# Patient Record
Sex: FEMALE | Race: WHITE | URBAN - METROPOLITAN AREA
[De-identification: names, ages, dates, MRNs, and addresses within clinical notes are randomized per-mention and may not be internally consistent; named-entity substitution may affect disease eponyms.]

---

## 2019-07-18 ENCOUNTER — NEW PATIENT (OUTPATIENT)
Dept: URBAN - METROPOLITAN AREA CLINIC 24 | Facility: CLINIC | Age: 74
End: 2019-07-18
Payer: MEDICARE

## 2019-07-18 DIAGNOSIS — E11.9 TYPE 2 DIABETES MELLITUS WITHOUT COMPLICATIONS: Primary | ICD-10-CM

## 2019-07-18 DIAGNOSIS — Z79.84 LONG TERM (CURRENT) USE OF ORAL ANTIDIABETIC DRUGS: ICD-10-CM

## 2019-07-18 PROCEDURE — 92004 COMPRE OPH EXAM NEW PT 1/>: CPT | Performed by: OPTOMETRIST

## 2019-07-18 PROCEDURE — 92134 CPTRZ OPH DX IMG PST SGM RTA: CPT | Performed by: OPTOMETRIST

## 2019-07-18 ASSESSMENT — VISUAL ACUITY
OD: 20/50
OS: 20/20

## 2019-07-18 ASSESSMENT — INTRAOCULAR PRESSURE
OD: 12
OS: 14

## 2019-07-18 ASSESSMENT — KERATOMETRY: OD: 46.12

## 2019-08-13 ENCOUNTER — Encounter (OUTPATIENT)
Dept: URBAN - METROPOLITAN AREA CLINIC 24 | Facility: CLINIC | Age: 74
End: 2019-08-13
Payer: MEDICARE

## 2019-08-13 DIAGNOSIS — Z01.818 ENCOUNTER FOR OTHER PREPROCEDURAL EXAMINATION: Primary | ICD-10-CM

## 2019-08-13 DIAGNOSIS — H26.491 OTHER SECONDARY CATARACT, RIGHT EYE: ICD-10-CM

## 2019-08-13 PROCEDURE — 99213 OFFICE O/P EST LOW 20 MIN: CPT | Performed by: PHYSICIAN ASSISTANT

## 2019-08-19 ENCOUNTER — SURGERY (OUTPATIENT)
Dept: URBAN - METROPOLITAN AREA SURGERY 12 | Facility: SURGERY | Age: 74
End: 2019-08-19
Payer: MEDICARE

## 2019-08-19 PROCEDURE — 66821 AFTER CATARACT LASER SURGERY: CPT | Performed by: OPHTHALMOLOGY

## 2021-03-31 ENCOUNTER — OFFICE VISIT (OUTPATIENT)
Dept: URBAN - METROPOLITAN AREA CLINIC 27 | Facility: CLINIC | Age: 76
End: 2021-03-31
Payer: MEDICARE

## 2021-03-31 DIAGNOSIS — H35.61 RETINAL HEMORRHAGE, RIGHT EYE: ICD-10-CM

## 2021-03-31 DIAGNOSIS — H35.371 PUCKERING OF MACULA, RIGHT EYE: ICD-10-CM

## 2021-03-31 PROCEDURE — 99204 OFFICE O/P NEW MOD 45 MIN: CPT | Performed by: OPHTHALMOLOGY

## 2021-03-31 PROCEDURE — 92134 CPTRZ OPH DX IMG PST SGM RTA: CPT | Performed by: OPHTHALMOLOGY

## 2021-03-31 PROCEDURE — 92235 FLUORESCEIN ANGRPH MLTIFRAME: CPT | Performed by: OPHTHALMOLOGY

## 2021-03-31 ASSESSMENT — INTRAOCULAR PRESSURE
OS: 16
OD: 21

## 2021-03-31 NOTE — IMPRESSION/PLAN
Impression: DME, OD Plan: Exam and OCT reveal DME. An IVFA from 03/31/2021 demonstrated no NVE. Fundus Photos from 03/31/2021 demonstrated no NVD. Recommend Avastin series with focal. 
RBA discussed. The patient elects Avastin (bilateral)

## 2021-03-31 NOTE — IMPRESSION/PLAN
Impression: ERM, OS. Plan: Exam and OCT reveal an ERM (379 microns)  Exam and OCT today show an epiretinal membrane (ERM), but the patient is doing well with their current vision, and as such we will observe the ERM for now.

## 2021-03-31 NOTE — IMPRESSION/PLAN
Impression: ERM, OD. Plan: Exam and OCT reveal an ERM (379 microns)  Exam and OCT today show an epiretinal membrane (ERM), but the patient is doing well with their current vision, and as such we will observe the ERM for now.

## 2021-03-31 NOTE — IMPRESSION/PLAN
Impression: DME, OS. Plan: Exam and OCT reveal DME. An IVFA from 03/31/2021 demonstrated no NVE. Fundus Photos from 03/31/2021 demonstrated no NVD. Recommend Avastin series with focal. 
RBA discussed. The patient elects Avastin (bilateral). Will consider vitrectomy in each eye. Thanks, Erwin Isaac Return in 2 days for focal OS, then focal OD, and in 1 month for re-eval DME OU

## 2021-04-02 ENCOUNTER — PROCEDURE (OUTPATIENT)
Dept: URBAN - METROPOLITAN AREA CLINIC 27 | Facility: CLINIC | Age: 76
End: 2021-04-02
Payer: MEDICARE

## 2021-04-02 PROCEDURE — 67210 TREATMENT OF RETINAL LESION: CPT | Performed by: OPHTHALMOLOGY

## 2021-04-02 ASSESSMENT — INTRAOCULAR PRESSURE
OD: 20
OS: 16

## 2021-04-09 ENCOUNTER — PROCEDURE (OUTPATIENT)
Dept: URBAN - METROPOLITAN AREA CLINIC 27 | Facility: CLINIC | Age: 76
End: 2021-04-09
Payer: MEDICARE

## 2021-04-09 PROCEDURE — 67210 TREATMENT OF RETINAL LESION: CPT | Performed by: OPHTHALMOLOGY

## 2021-04-09 ASSESSMENT — INTRAOCULAR PRESSURE
OD: 18
OS: 14

## 2021-04-28 ENCOUNTER — OFFICE VISIT (OUTPATIENT)
Dept: URBAN - METROPOLITAN AREA CLINIC 27 | Facility: CLINIC | Age: 76
End: 2021-04-28
Payer: MEDICARE

## 2021-04-28 DIAGNOSIS — H43.813 VITREOUS DEGENERATION, BILATERAL: ICD-10-CM

## 2021-04-28 DIAGNOSIS — H35.373 PUCKERING OF MACULA, BILATERAL: ICD-10-CM

## 2021-04-28 DIAGNOSIS — H04.123 DRY EYE SYNDROME OF BILATERAL LACRIMAL GLANDS: ICD-10-CM

## 2021-04-28 PROCEDURE — 99024 POSTOP FOLLOW-UP VISIT: CPT | Performed by: OPHTHALMOLOGY

## 2021-04-28 PROCEDURE — 92134 CPTRZ OPH DX IMG PST SGM RTA: CPT | Performed by: OPHTHALMOLOGY

## 2021-04-28 ASSESSMENT — INTRAOCULAR PRESSURE
OS: 19
OD: 25

## 2021-04-28 NOTE — IMPRESSION/PLAN
Impression: ERM, OS. Plan: Exam and OCT reveal an ERM OS (345 microns, from 379 microns)  Observe for now.

## 2021-04-28 NOTE — IMPRESSION/PLAN
Impression: DME, OS. CSCR OS
s/p Focal 04/02/2021 
s/p Avastin x 1 last 03/31/2021 Plan: Exam and OCT reveal DME and SRF. An IVFA from 03/31/2021 demonstrated no NVE. Fundus Photos from 03/31/2021 demonstrated no NVD. Recommend Avastin  RBA discussed. The patient elects Avastin (bilateral). Will consider vitrectomy in each eye. Thanks, Leah Palomares Return in 1 month for re-eval DME OU

## 2021-04-28 NOTE — IMPRESSION/PLAN
Impression: ERM, OD. Plan: Exam and OCT reveal an ERM OD (283 microns, from 379 microns),  Observe for now.

## 2021-04-28 NOTE — IMPRESSION/PLAN
Impression: DME, OD
s/p Focal 04/09/2021 
s/p Avastin x 1 last 03/31/2021  Plan: Exam and OCT reveal DME. An IVFA from 03/31/2021 demonstrated no NVE. Fundus Photos from 03/31/2021 demonstrated no NVD. Recommend Avastin  RBA discussed.  The patient elects Avastin (bilateral)

## 2021-05-28 ENCOUNTER — OFFICE VISIT (OUTPATIENT)
Dept: URBAN - METROPOLITAN AREA CLINIC 27 | Facility: CLINIC | Age: 76
End: 2021-05-28
Payer: MEDICARE

## 2021-05-28 PROCEDURE — 99024 POSTOP FOLLOW-UP VISIT: CPT | Performed by: OPHTHALMOLOGY

## 2021-05-28 ASSESSMENT — INTRAOCULAR PRESSURE
OD: 12
OS: 18

## 2021-05-28 NOTE — IMPRESSION/PLAN
Impression: DME, OS. CSCR OS
s/p Focal 04/02/2021 
s/p Avastin x 1 last 03/31/2021 Plan: Exam and OCT reveal DME and SRF. An IVFA from 03/31/2021 demonstrated no NVE. Fundus Photos from 03/31/2021 demonstrated no NVD. Recommend Avastin  RBA discussed. The patient elects Avastin (bilateral). Will consider vitrectomy in each eye. Thanks, Sulma Vee Return in 1 month for re-eval DME OU

## 2021-05-28 NOTE — IMPRESSION/PLAN
Impression: ERM, OD. Plan: Exam and OCT reveal an ERM OD (389 microns, from 379 microns),  Observe for now.

## 2021-05-28 NOTE — IMPRESSION/PLAN
Impression: ERM, OS. Plan: Exam and OCT reveal an ERM OS (339 microns, from 379 microns)  Observe for now.

## 2021-07-09 ENCOUNTER — OFFICE VISIT (OUTPATIENT)
Dept: URBAN - METROPOLITAN AREA CLINIC 27 | Facility: CLINIC | Age: 76
End: 2021-07-09
Payer: MEDICARE

## 2021-07-09 PROCEDURE — 92014 COMPRE OPH EXAM EST PT 1/>: CPT | Performed by: OPHTHALMOLOGY

## 2021-07-09 PROCEDURE — 92134 CPTRZ OPH DX IMG PST SGM RTA: CPT | Performed by: OPHTHALMOLOGY

## 2021-07-09 ASSESSMENT — INTRAOCULAR PRESSURE
OD: 23
OS: 17

## 2021-07-09 NOTE — IMPRESSION/PLAN
Impression: DME, OS. CSCR OS
s/p Focal 04/02/2021 
s/p Avastin x 2 last 05/28/2021 Plan: Exam and OCT reveal DME and SRF. An IVFA from 03/31/2021 demonstrated no NVE. Fundus Photos from 03/31/2021 demonstrated no NVD. Recommend Avastin  RBA discussed. The patient elects Avastin (bilateral). Will consider vitrectomy in each eye. Thanks, Jose Ashli Return in 1 month for re-eval DME OU

## 2021-07-09 NOTE — IMPRESSION/PLAN
Impression: DME, OD
s/p Focal 04/09/2021 
s/p Avastin x 2 last 05/28/2021 Plan: Exam and OCT reveal DME. An IVFA from 03/31/2021 demonstrated no NVE. Fundus Photos from 03/31/2021 demonstrated no NVD. Recommend Avastin  RBA discussed.  The patient elects Avastin (bilateral)

## 2021-07-09 NOTE — IMPRESSION/PLAN
Impression: ERM, OD. Plan: Exam and OCT reveal an ERM OD (384 microns, from 379 microns),  Observe for now.

## 2021-08-20 ENCOUNTER — OFFICE VISIT (OUTPATIENT)
Dept: URBAN - METROPOLITAN AREA CLINIC 27 | Facility: CLINIC | Age: 76
End: 2021-08-20
Payer: MEDICARE

## 2021-08-20 PROCEDURE — 92014 COMPRE OPH EXAM EST PT 1/>: CPT | Performed by: OPHTHALMOLOGY

## 2021-08-20 PROCEDURE — 92134 CPTRZ OPH DX IMG PST SGM RTA: CPT | Performed by: OPHTHALMOLOGY

## 2021-08-20 PROCEDURE — 67210 TREATMENT OF RETINAL LESION: CPT | Performed by: OPHTHALMOLOGY

## 2021-08-20 ASSESSMENT — INTRAOCULAR PRESSURE
OD: 19
OS: 10

## 2021-08-20 NOTE — IMPRESSION/PLAN
Impression: DME, OD
s/p Focal 04/09/2021 
s/p Avastin x 2 last 07/09/2021  Plan: Exam and OCT reveal DME. An IVFA from 03/31/2021 demonstrated no NVE. Fundus Photos from 03/31/2021 demonstrated no NVD. Recommend Avastin. Will schedule.

## 2021-08-20 NOTE — IMPRESSION/PLAN
Impression: DME, OS. CSCR OS
s/p Focal 04/02/2021 
s/p Avastin x 2 last 07/09/2021  Plan: Exam and OCT reveal DME and SRF. An IVFA from 03/31/2021 demonstrated no NVE. Fundus Photos from 03/31/2021 demonstrated no NVD. Recommend Avastin series with focal.  RBA discussed. The patient elects focal OS. Will consider vitrectomy in each eye. Thanks, Jose Ashli Return in 1 week for Avastin OU, then focal OD, and in 1 month for re-eval DME OU, IVFA OS 1st

## 2021-08-20 NOTE — IMPRESSION/PLAN
Impression: ERM, OD. Plan: Exam and OCT reveal an ERM OD (394 microns, from 379 microns),  Observe for now.

## 2021-09-03 ENCOUNTER — PROCEDURE (OUTPATIENT)
Dept: URBAN - METROPOLITAN AREA CLINIC 27 | Facility: CLINIC | Age: 76
End: 2021-09-03
Payer: MEDICARE

## 2021-09-03 ASSESSMENT — INTRAOCULAR PRESSURE
OD: 19
OS: 17

## 2021-09-15 ENCOUNTER — PROCEDURE (OUTPATIENT)
Dept: URBAN - METROPOLITAN AREA CLINIC 27 | Facility: CLINIC | Age: 76
End: 2021-09-15
Payer: MEDICARE

## 2021-09-15 PROCEDURE — 67210 TREATMENT OF RETINAL LESION: CPT | Performed by: OPHTHALMOLOGY

## 2021-09-15 ASSESSMENT — INTRAOCULAR PRESSURE
OS: 20
OD: 20

## 2021-10-27 ENCOUNTER — OFFICE VISIT (OUTPATIENT)
Dept: URBAN - METROPOLITAN AREA CLINIC 27 | Facility: CLINIC | Age: 76
End: 2021-10-27
Payer: MEDICARE

## 2021-10-27 DIAGNOSIS — E11.3313 TYPE 2 DIAB W MODERATE NONPRLF DIAB RTNOP W MACULAR EDEMA, BILATERAL: Primary | ICD-10-CM

## 2021-10-27 DIAGNOSIS — Z96.1 PRESENCE OF INTRAOCULAR LENS: ICD-10-CM

## 2021-10-27 PROCEDURE — 92134 CPTRZ OPH DX IMG PST SGM RTA: CPT | Performed by: OPHTHALMOLOGY

## 2021-10-27 PROCEDURE — 99024 POSTOP FOLLOW-UP VISIT: CPT | Performed by: OPHTHALMOLOGY

## 2021-10-27 PROCEDURE — 92235 FLUORESCEIN ANGRPH MLTIFRAME: CPT | Performed by: OPHTHALMOLOGY

## 2021-10-27 ASSESSMENT — INTRAOCULAR PRESSURE
OD: 14
OS: 14

## 2021-10-27 NOTE — IMPRESSION/PLAN
Impression: DME, OS. CSCR OS
s/p Focal 08/20/2021 
s/p Avastin x 3  last 09/03/2021 Plan: Exam and OCT reveal DME and SRF. An IVFA from 10/27/2021 demonstrated no NVE. Fundus Photos from 10/27/2021 demonstrated no NVD. Recommend Avastin. R/B/A discussed with patient. The risks of Avastin were discussed, including off-label use and compounding pharmacy risks, and the patient elects to proceed with Avastin. After 2% Subconjunctival anesthesia & betadine prep, 1.25mg of Avastin was injected in the eye. Cold compress and Tylenol suggested for pain if needed. Thanks, Elizabeth Monroy Return in  1 month for re-eval DME OU

## 2021-10-27 NOTE — IMPRESSION/PLAN
Impression: ERM, OD. Plan: Exam and OCT reveal an ERM OD (383 microns, from 379 microns),  Observe for now.

## 2021-10-27 NOTE — IMPRESSION/PLAN
Impression: DME, OD
s/p Focal 09/15/2021 
s/p Avastin x 3  last 09/03/2021 Plan: Exam and OCT reveal DME. An IVFA from 10/27/2021 demonstrated no NVE. Fundus Photos from 10/27/2021 demonstrated no NVD. Recommend Avastin. RBA discussed.  Pt elects Avastin (bilateral)

## 2022-02-04 ENCOUNTER — OFFICE VISIT (OUTPATIENT)
Dept: URBAN - METROPOLITAN AREA CLINIC 27 | Facility: CLINIC | Age: 77
End: 2022-02-04
Payer: MEDICARE

## 2022-02-04 PROCEDURE — 92134 CPTRZ OPH DX IMG PST SGM RTA: CPT | Performed by: OPHTHALMOLOGY

## 2022-02-04 PROCEDURE — 99214 OFFICE O/P EST MOD 30 MIN: CPT | Performed by: OPHTHALMOLOGY

## 2022-02-04 RX ORDER — MOXIFLOXACIN HYDROCHLORIDE 5 MG/ML
0.5 % SOLUTION/ DROPS OPHTHALMIC
Qty: 1 | Refills: 3 | Status: ACTIVE
Start: 2022-02-04

## 2022-02-04 ASSESSMENT — INTRAOCULAR PRESSURE
OS: 20
OD: 21

## 2022-02-04 NOTE — IMPRESSION/PLAN
Impression: DME, OS. CSCR OS
s/p Focal 08/20/2021 
s/p Avastin x 4  last 10/27/2021 Plan: The patient notes blurring. Exam and OCT reveal DME and SRF. An IVFA from 10/27/2021 demonstrated no NVE. Fundus Photos from 10/27/2021 demonstrated no NVD. Recommend Avastin series with focal. 
R/B/A discussed with patient. The risks of Avastin were discussed, including off-label use and compounding pharmacy risks, and the patient elects to proceed with Avastin. After 2% Subconjunctival anesthesia prep, 1.25mg of Avastin was injected in the eye. Cold compress and Tylenol suggested for pain if needed. The patient will use antibiotcs at home. Thanks, Kory Mora Return in 1 week for focal OS, then focal OD, and in 1 month for re-eval DME OU

## 2022-02-04 NOTE — IMPRESSION/PLAN
Impression: DME, OD
s/p Focal 09/15/2021 
s/p Avastin x 4 last 10/27/2021 Plan: Exam and OCT reveal DME. An IVFA from 10/27/2021 demonstrated no NVE. Fundus Photos from 10/27/2021 demonstrated no NVD. Recommend Avastin series with focal.. RBA discussed.  Pt elects Avastin (bilateral)

## 2022-02-16 ENCOUNTER — PROCEDURE (OUTPATIENT)
Dept: URBAN - METROPOLITAN AREA CLINIC 27 | Facility: CLINIC | Age: 77
End: 2022-02-16
Payer: MEDICARE

## 2022-02-16 PROCEDURE — 67210 TREATMENT OF RETINAL LESION: CPT | Performed by: OPHTHALMOLOGY

## 2022-02-16 ASSESSMENT — INTRAOCULAR PRESSURE
OS: 18
OD: 24

## 2022-03-04 ENCOUNTER — OFFICE VISIT (OUTPATIENT)
Dept: URBAN - METROPOLITAN AREA CLINIC 27 | Facility: CLINIC | Age: 77
End: 2022-03-04
Payer: MEDICARE

## 2022-03-04 PROCEDURE — 67210 TREATMENT OF RETINAL LESION: CPT | Performed by: OPHTHALMOLOGY

## 2022-03-04 ASSESSMENT — INTRAOCULAR PRESSURE
OD: 18
OS: 19

## 2022-03-11 ENCOUNTER — OFFICE VISIT (OUTPATIENT)
Dept: URBAN - METROPOLITAN AREA CLINIC 27 | Facility: CLINIC | Age: 77
End: 2022-03-11
Payer: MEDICARE

## 2022-03-11 PROCEDURE — 92134 CPTRZ OPH DX IMG PST SGM RTA: CPT | Performed by: OPHTHALMOLOGY

## 2022-03-11 PROCEDURE — 99024 POSTOP FOLLOW-UP VISIT: CPT | Performed by: OPHTHALMOLOGY

## 2022-03-11 ASSESSMENT — INTRAOCULAR PRESSURE
OS: 18
OD: 21

## 2022-04-29 ENCOUNTER — OFFICE VISIT (OUTPATIENT)
Dept: URBAN - METROPOLITAN AREA CLINIC 27 | Facility: CLINIC | Age: 77
End: 2022-04-29
Payer: MEDICARE

## 2022-04-29 DIAGNOSIS — H35.373 PUCKERING OF MACULA, BILATERAL: ICD-10-CM

## 2022-04-29 DIAGNOSIS — Z96.1 PRESENCE OF INTRAOCULAR LENS: ICD-10-CM

## 2022-04-29 DIAGNOSIS — E11.3313 TYPE 2 DIABETES MELLITUS WITH MODERATE NONPROLIFERATIVE DIABETIC RETINOPATHY WITH MACULAR EDEMA, BILATERAL: Primary | ICD-10-CM

## 2022-04-29 PROCEDURE — 92134 CPTRZ OPH DX IMG PST SGM RTA: CPT | Performed by: OPHTHALMOLOGY

## 2022-04-29 PROCEDURE — 99024 POSTOP FOLLOW-UP VISIT: CPT | Performed by: OPHTHALMOLOGY

## 2022-04-29 ASSESSMENT — INTRAOCULAR PRESSURE
OS: 21
OD: 25

## 2022-04-29 NOTE — IMPRESSION/PLAN
Impression: ERM, OD. Plan: Exam and OCT reveal an ERM OD (397 microns, from 379 microns),  Observe for now.

## 2022-04-29 NOTE — IMPRESSION/PLAN
Impression: DME, OD
s/p Focal 03/04/2022
s/p Avastin x 6 last 03/11/2022 Plan: Exam and OCT reveal DME. An IVFA from 10/27/2021 demonstrated no NVE. Fundus Photos from 10/27/2021 demonstrated no NVD. Recommend Avastin series with focal.. RBA discussed.  Pt elects Avastin (bilateral)

## 2022-04-29 NOTE — IMPRESSION/PLAN
Impression: DME, OS. CSCR OS
s/p Focal 02/16/2022
s/p Avastin x 6  last 03/11/2022 Plan: The patient notes blurring. Exam and OCT reveal DME and SRF. An IVFA from 10/27/2021 demonstrated no NVE. Fundus Photos from 10/27/2021 demonstrated no NVD. Recommend Avastin. R/B/A discussed with patient. The risks of Avastin were discussed, including off-label use and compounding pharmacy risks, and the patient elects to proceed with Avastin. After 2% Subconjunctival anesthesia prep, 1.25mg of Avastin was injected in the eye. Cold compress and Tylenol suggested for pain if needed. The patient will use antibiotcs at home. Thanks, Leah Palomares Return in 1 month for re-eval DME OU, IVFA OS 1st

## 2022-05-27 ENCOUNTER — OFFICE VISIT (OUTPATIENT)
Dept: URBAN - METROPOLITAN AREA CLINIC 27 | Facility: CLINIC | Age: 77
End: 2022-05-27
Payer: MEDICARE

## 2022-05-27 DIAGNOSIS — H43.813 VITREOUS DEGENERATION, BILATERAL: ICD-10-CM

## 2022-05-27 PROCEDURE — 92134 CPTRZ OPH DX IMG PST SGM RTA: CPT | Performed by: OPHTHALMOLOGY

## 2022-05-27 PROCEDURE — 99024 POSTOP FOLLOW-UP VISIT: CPT | Performed by: OPHTHALMOLOGY

## 2022-05-27 PROCEDURE — 92235 FLUORESCEIN ANGRPH MLTIFRAME: CPT | Performed by: OPHTHALMOLOGY

## 2022-05-27 ASSESSMENT — INTRAOCULAR PRESSURE
OD: 20
OS: 18

## 2022-05-27 NOTE — IMPRESSION/PLAN
Impression: DME, OD
s/p Focal 03/04/2022
s/p Avastin x 7 last 04/29/2022 Plan: Exam and OCT reveal DME. An IVFA from 05/27/2022 demonstrated no NVE. Fundus Photos from 05/27/2022 demonstrated no NVD. Recommend Avastin series with focal.. RBA discussed.  Pt elects Avastin (bilateral)

## 2022-05-27 NOTE — IMPRESSION/PLAN
Impression: ERM, OD. Plan: Exam and OCT reveal an ERM OD (396 microns, from 379 microns),  Observe for now.

## 2022-05-27 NOTE — IMPRESSION/PLAN
Impression: DME, OS. CSCR OS
s/p Focal 02/16/2022
s/p Avastin x 7  last 04/29/2022 Plan: Exam and OCT reveal DME and SRF. An IVFA from 05/27/2022 demonstrated no NVE. Fundus Photos from 05/27/2022 demonstrated no NVD. Recommend Avastin. R/B/A discussed with patient. The risks of Avastin were discussed, including off-label use and compounding pharmacy risks, and the patient elects to proceed with Avastin. After 2% Subconjunctival anesthesia prep, 1.25mg of Avastin was injected in the eye. Cold compress and Tylenol suggested for pain if needed. The patient will use antibiotcs at home. Thanks, Gerson Gibbs Return in 1 month for re-eval DME OU

## 2022-08-19 ENCOUNTER — OFFICE VISIT (OUTPATIENT)
Dept: URBAN - METROPOLITAN AREA CLINIC 27 | Facility: CLINIC | Age: 77
End: 2022-08-19
Payer: MEDICARE

## 2022-08-19 DIAGNOSIS — H43.813 VITREOUS DEGENERATION, BILATERAL: ICD-10-CM

## 2022-08-19 DIAGNOSIS — Z96.1 PRESENCE OF INTRAOCULAR LENS: ICD-10-CM

## 2022-08-19 DIAGNOSIS — H35.373 PUCKERING OF MACULA, BILATERAL: ICD-10-CM

## 2022-08-19 DIAGNOSIS — E11.3313 TYPE 2 DIAB W MODERATE NONPRLF DIAB RTNOP W MACULAR EDEMA, BILATERAL: Primary | ICD-10-CM

## 2022-08-19 PROCEDURE — 92014 COMPRE OPH EXAM EST PT 1/>: CPT | Performed by: OPHTHALMOLOGY

## 2022-08-19 PROCEDURE — 92134 CPTRZ OPH DX IMG PST SGM RTA: CPT | Performed by: OPHTHALMOLOGY

## 2022-08-19 ASSESSMENT — INTRAOCULAR PRESSURE
OS: 23
OD: 22

## 2022-08-19 NOTE — IMPRESSION/PLAN
Impression: DME, OD
s/p Focal 03/04/2022
s/p Avastin x 8 last 05/27/2022 Plan: Exam and OCT reveal DME. An IVFA from 05/27/2022 demonstrated no NVE. Fundus Photos from 05/27/2022 demonstrated no NVD. Recommend Avastin. RBA discussed.  Pt elects Avastin (bilateral)

## 2022-08-19 NOTE — IMPRESSION/PLAN
Impression: DME, OS. CSCR OS
s/p Focal 02/16/2022
s/p Avastin x 8  last 05/27/2022 Plan: Exam and OCT reveal DME and SRF. An IVFA from 05/27/2022 demonstrated no NVE. Fundus Photos from 05/27/2022 demonstrated no NVD. Recommend Avastin. R/B/A discussed with patient. The risks of Avastin were discussed, including off-label use and compounding pharmacy risks, and the patient elects to proceed with Avastin. After 2% Subconjunctival anesthesia prep, 1.25mg of Avastin was injected in the eye. Cold compress and Tylenol suggested for pain if needed. The patient will use antibiotcs at home. Thanks, Tai Israel Return in 1 month for re-eval DME OU

## 2022-09-21 ENCOUNTER — OFFICE VISIT (OUTPATIENT)
Dept: URBAN - METROPOLITAN AREA CLINIC 27 | Facility: CLINIC | Age: 77
End: 2022-09-21
Payer: MEDICARE

## 2022-09-21 DIAGNOSIS — H43.813 VITREOUS DEGENERATION, BILATERAL: ICD-10-CM

## 2022-09-21 DIAGNOSIS — E11.3313 TYPE 2 DIAB W MODERATE NONPRLF DIAB RTNOP W MACULAR EDEMA, BILATERAL: Primary | ICD-10-CM

## 2022-09-21 DIAGNOSIS — Z96.1 PRESENCE OF INTRAOCULAR LENS: ICD-10-CM

## 2022-09-21 DIAGNOSIS — H35.373 PUCKERING OF MACULA, BILATERAL: ICD-10-CM

## 2022-09-21 PROCEDURE — 92134 CPTRZ OPH DX IMG PST SGM RTA: CPT | Performed by: OPHTHALMOLOGY

## 2022-09-21 PROCEDURE — 92235 FLUORESCEIN ANGRPH MLTIFRAME: CPT | Performed by: OPHTHALMOLOGY

## 2022-09-21 PROCEDURE — 99214 OFFICE O/P EST MOD 30 MIN: CPT | Performed by: OPHTHALMOLOGY

## 2022-09-21 RX ORDER — MOXIFLOXACIN 5 MG/ML
0.5 % SOLUTION/ DROPS OPHTHALMIC
Qty: 1 | Refills: 4 | Status: ACTIVE
Start: 2022-09-21

## 2022-09-21 ASSESSMENT — INTRAOCULAR PRESSURE
OD: 25
OS: 20

## 2022-09-21 NOTE — IMPRESSION/PLAN
Impression: DME, OD
s/p Focal 03/04/2022
s/p Avastin x 9 last 08/19/2022 Plan: Exam and OCT reveal DME. An IVFA from 05/27/2022 demonstrated no NVE. Fundus Photos from 05/27/2022 demonstrated no NVD. Recommend Avastin. RBA discussed.  Pt elects Avastin (bilateral)

## 2022-09-21 NOTE — IMPRESSION/PLAN
Impression: ERM, OD. Plan: Exam and OCT reveal an ERM OD (383 microns, from 396 microns, from 379 microns),  Observe for now.

## 2022-09-21 NOTE — IMPRESSION/PLAN
Impression: DME, OS. CSCR OS
s/p Focal 02/16/2022
s/p Avastin x 9  last 08/19/2022 Plan: Exam and OCT reveal DME and SRF. An IVFA from 05/27/2022 demonstrated no NVE. Fundus Photos from 05/27/2022 demonstrated no NVD. Recommend Avastin. R/B/A discussed with patient. The risks of Avastin were discussed, including off-label use and compounding pharmacy risks, and the patient elects to proceed with Avastin. After 2% Subconjunctival anesthesia prep, 1.25mg of Avastin was injected in the eye. Cold compress and Tylenol suggested for pain if needed. The patient will use antibiotcs at home. Thanks, Sulma Vee Return in 1 month for re-eval DME OU

## 2022-09-28 ENCOUNTER — OFFICE VISIT (OUTPATIENT)
Dept: URBAN - METROPOLITAN AREA CLINIC 27 | Facility: CLINIC | Age: 77
End: 2022-09-28
Payer: MEDICARE

## 2022-09-28 DIAGNOSIS — E11.3313 TYPE 2 DIABETES MELLITUS WITH MODERATE NONPROLIFERATIVE DIABETIC RETINOPATHY WITH MACULAR EDEMA, BILATERAL: Primary | ICD-10-CM

## 2022-09-28 PROCEDURE — 67210 TREATMENT OF RETINAL LESION: CPT | Performed by: OPHTHALMOLOGY

## 2022-09-28 ASSESSMENT — INTRAOCULAR PRESSURE
OD: 23
OS: 21

## 2022-11-04 ENCOUNTER — OFFICE VISIT (OUTPATIENT)
Dept: URBAN - METROPOLITAN AREA CLINIC 27 | Facility: CLINIC | Age: 77
End: 2022-11-04
Payer: MEDICARE

## 2022-11-04 DIAGNOSIS — Z96.1 PRESENCE OF INTRAOCULAR LENS: ICD-10-CM

## 2022-11-04 DIAGNOSIS — H35.373 PUCKERING OF MACULA, BILATERAL: ICD-10-CM

## 2022-11-04 DIAGNOSIS — H43.813 VITREOUS DEGENERATION, BILATERAL: ICD-10-CM

## 2022-11-04 DIAGNOSIS — E11.3313 TYPE 2 DIAB W MODERATE NONPRLF DIAB RTNOP W MACULAR EDEMA, BILATERAL: Primary | ICD-10-CM

## 2022-11-04 PROCEDURE — 92134 CPTRZ OPH DX IMG PST SGM RTA: CPT | Performed by: OPHTHALMOLOGY

## 2022-11-04 PROCEDURE — 99024 POSTOP FOLLOW-UP VISIT: CPT | Performed by: OPHTHALMOLOGY

## 2022-11-04 ASSESSMENT — INTRAOCULAR PRESSURE
OD: 31
OS: 21

## 2022-11-04 NOTE — IMPRESSION/PLAN
Impression: DME, OD
s/p Focal 03/04/2022
s/p Avastin x 10  last 09/21/2022  Plan: Exam and OCT reveal DME. An IVFA from 05/27/2022 demonstrated no NVE. Fundus Photos from 05/27/2022 demonstrated no NVD. Recommend Avastin. RBA discussed.  Pt elects Avastin (bilateral)

## 2022-11-04 NOTE — IMPRESSION/PLAN
Impression: DME, OS. CSCR OS
s/p Focal 02/16/2022
s/p Avastin x 10  last 09/21/2022  Plan: Exam and OCT reveal DME and SRF. An IVFA from 05/27/2022 demonstrated no NVE. Fundus Photos from 05/27/2022 demonstrated no NVD. Recommend Avastin. R/B/A discussed with patient. The risks of Avastin were discussed, including off-label use and compounding pharmacy risks, and the patient elects to proceed with Avastin. After 2% Subconjunctival anesthesia prep, 1.25mg of Avastin was injected in the eye. Cold compress and Tylenol suggested for pain if needed. The patient will use antibiotcs at home. Thanks, Leah Palomares Return in 1 month for re-eval DME OU

## 2022-12-09 ENCOUNTER — OFFICE VISIT (OUTPATIENT)
Dept: URBAN - METROPOLITAN AREA CLINIC 27 | Facility: CLINIC | Age: 77
End: 2022-12-09
Payer: MEDICARE

## 2022-12-09 DIAGNOSIS — H35.373 PUCKERING OF MACULA, BILATERAL: ICD-10-CM

## 2022-12-09 DIAGNOSIS — H43.813 VITREOUS DEGENERATION, BILATERAL: ICD-10-CM

## 2022-12-09 DIAGNOSIS — E11.3313 TYPE 2 DIAB W MODERATE NONPRLF DIAB RTNOP W MACULAR EDEMA, BILATERAL: Primary | ICD-10-CM

## 2022-12-09 DIAGNOSIS — Z96.1 PRESENCE OF INTRAOCULAR LENS: ICD-10-CM

## 2022-12-09 PROCEDURE — 99024 POSTOP FOLLOW-UP VISIT: CPT | Performed by: OPHTHALMOLOGY

## 2022-12-09 PROCEDURE — 92134 CPTRZ OPH DX IMG PST SGM RTA: CPT | Performed by: OPHTHALMOLOGY

## 2022-12-09 ASSESSMENT — INTRAOCULAR PRESSURE
OS: 12
OD: 15

## 2022-12-09 NOTE — IMPRESSION/PLAN
Impression: ERM, OD. Plan: Exam and OCT reveal an ERM OD (390 microns, from 396 microns, from 379 microns),  Observe for now.

## 2022-12-09 NOTE — IMPRESSION/PLAN
Impression: DME, OD
s/p Focal 03/04/2022
s/p Avastin x 11  last 11/04/2022  Plan: Exam and OCT reveal DME. An IVFA from 05/27/2022 demonstrated no NVE. Fundus Photos from 05/27/2022 demonstrated no NVD. Recommend Avastin. RBA discussed.  Pt elects Avastin (bilateral)

## 2022-12-09 NOTE — IMPRESSION/PLAN
Impression: DME, OS. CSCR OS
s/p Focal 02/16/2022
s/p Avastin x 11  last 11/04/2022 Plan: Exam and OCT reveal DME and SRF. An IVFA from 05/27/2022 demonstrated no NVE. Fundus Photos from 05/27/2022 demonstrated no NVD. Recommend Avastin. R/B/A discussed with patient. The risks of Avastin were discussed, including off-label use and compounding pharmacy risks, and the patient elects to proceed with Avastin. After 2% Subconjunctival anesthesia prep, 1.25mg of Avastin was injected in the eye. Cold compress and Tylenol suggested for pain if needed. The patient will use antibiotcs at home. Thanks, Feliciano Varghese Return in 1 month for re-eval DME OU, IVFA OS 1st

## 2023-01-20 ENCOUNTER — OFFICE VISIT (OUTPATIENT)
Dept: URBAN - METROPOLITAN AREA CLINIC 27 | Facility: CLINIC | Age: 78
End: 2023-01-20
Payer: MEDICARE

## 2023-01-20 DIAGNOSIS — H43.813 VITREOUS DEGENERATION, BILATERAL: ICD-10-CM

## 2023-01-20 DIAGNOSIS — Z96.1 PRESENCE OF INTRAOCULAR LENS: ICD-10-CM

## 2023-01-20 DIAGNOSIS — H35.373 PUCKERING OF MACULA, BILATERAL: ICD-10-CM

## 2023-01-20 DIAGNOSIS — E11.3313 TYPE 2 DIAB W MODERATE NONPRLF DIAB RTNOP W MACULAR EDEMA, BILATERAL: Primary | ICD-10-CM

## 2023-01-20 PROCEDURE — 92014 COMPRE OPH EXAM EST PT 1/>: CPT | Performed by: OPHTHALMOLOGY

## 2023-01-20 PROCEDURE — 92134 CPTRZ OPH DX IMG PST SGM RTA: CPT | Performed by: OPHTHALMOLOGY

## 2023-01-20 PROCEDURE — 67210 TREATMENT OF RETINAL LESION: CPT | Performed by: OPHTHALMOLOGY

## 2023-01-20 PROCEDURE — 92235 FLUORESCEIN ANGRPH MLTIFRAME: CPT | Performed by: OPHTHALMOLOGY

## 2023-01-20 ASSESSMENT — INTRAOCULAR PRESSURE
OS: 18
OD: 21

## 2023-01-20 NOTE — IMPRESSION/PLAN
Impression: DME, OS. CSCR OS
s/p Focal 02/16/2022
s/p Avastin x 12  last 12/09/2022  Plan: Exam and OCT reveal DME and SRF. An IVFA from 01/20/2023 demonstrated no NVE. Fundus Photos from 01/20/2023 demonstrated no NVD. Recommend focal with Avastin series. R/B/A discussed with patient. The risks of Avastin were discussed, including off-label use and compounding pharmacy risks, and the patient elects to proceed with focal laser today. The patient will use antibiotcs at home. Thanks, Elisa Bernard Return in 1 week Avastin OU, then focal OD, then in 1 month for re-eval DME OU

## 2023-01-20 NOTE — IMPRESSION/PLAN
Impression: DME, OD
s/p Focal 03/04/2022
s/p Avastin x 12  last 12/09/2022  Plan: Exam and OCT reveal DME. An IVFA from 01/20/2023 demonstrated no NVE. Fundus Photos from 01/20/2023 demonstrated no NVD. Recommend Avastin series and focal.
Will schedule.

## 2023-01-27 ENCOUNTER — OFFICE VISIT (OUTPATIENT)
Dept: URBAN - METROPOLITAN AREA CLINIC 27 | Facility: CLINIC | Age: 78
End: 2023-01-27
Payer: MEDICARE

## 2023-01-27 DIAGNOSIS — E11.3313 TYPE 2 DIABETES MELLITUS WITH MODERATE NONPROLIFERATIVE DIABETIC RETINOPATHY WITH MACULAR EDEMA, BILATERAL: Primary | ICD-10-CM

## 2023-01-27 ASSESSMENT — INTRAOCULAR PRESSURE
OS: 19
OD: 21

## 2023-02-01 ENCOUNTER — PROCEDURE (OUTPATIENT)
Dept: URBAN - METROPOLITAN AREA CLINIC 27 | Facility: CLINIC | Age: 78
End: 2023-02-01
Payer: MEDICARE

## 2023-02-01 DIAGNOSIS — E11.3313 TYPE 2 DIABETES MELLITUS WITH MODERATE NONPROLIFERATIVE DIABETIC RETINOPATHY WITH MACULAR EDEMA, BILATERAL: Primary | ICD-10-CM

## 2023-02-01 PROCEDURE — 67210 TREATMENT OF RETINAL LESION: CPT | Performed by: OPHTHALMOLOGY

## 2023-02-01 ASSESSMENT — INTRAOCULAR PRESSURE
OD: 20
OS: 15

## 2023-03-01 ENCOUNTER — OFFICE VISIT (OUTPATIENT)
Dept: URBAN - METROPOLITAN AREA CLINIC 27 | Facility: CLINIC | Age: 78
End: 2023-03-01
Payer: MEDICARE

## 2023-03-01 DIAGNOSIS — H35.373 PUCKERING OF MACULA, BILATERAL: ICD-10-CM

## 2023-03-01 DIAGNOSIS — H43.813 VITREOUS DEGENERATION, BILATERAL: ICD-10-CM

## 2023-03-01 DIAGNOSIS — Z96.1 PRESENCE OF INTRAOCULAR LENS: ICD-10-CM

## 2023-03-01 DIAGNOSIS — E11.3313 TYPE 2 DIAB W MODERATE NONPRLF DIAB RTNOP W MACULAR EDEMA, BILATERAL: Primary | ICD-10-CM

## 2023-03-01 PROCEDURE — 92134 CPTRZ OPH DX IMG PST SGM RTA: CPT | Performed by: OPHTHALMOLOGY

## 2023-03-01 PROCEDURE — 99024 POSTOP FOLLOW-UP VISIT: CPT | Performed by: OPHTHALMOLOGY

## 2023-03-01 ASSESSMENT — INTRAOCULAR PRESSURE
OS: 16
OD: 14

## 2023-03-01 NOTE — IMPRESSION/PLAN
Impression: DME, OD
s/p Focal 02/01/2023
s/p Avastin x 13  last 01/27/2023 Plan: Exam and OCT reveal DME. An IVFA from 01/20/2023 demonstrated no NVE. Fundus Photos from 01/20/2023 demonstrated no NVD. Recommend Avastin. RBA discussed.  The patient elects Avastin

## 2023-03-01 NOTE — IMPRESSION/PLAN
Impression: DME, OS. CSCR OS
s/p Focal 01/20/2023 
s/p Avastin x 13  last 01/27/2023 Plan: Exam and OCT reveal DME and SRF. An IVFA from 01/20/2023 demonstrated no NVE. Fundus Photos from 01/20/2023 demonstrated no NVD. Recommend Avastin. R/B/A discussed with patient. The risks of Avastin were discussed, including off-label use and compounding pharmacy risks, and the patient elects to proceed with Avastin today. The patient will use antibiotcs at home. Thanks, Erwin Isaac Return in  1 month for re-eval DME OU

## 2023-05-19 NOTE — IMPRESSION/PLAN
Impression: DME, OD
s/p Focal 03/04/2022
s/p Avastin x 5 last 02/04/2022 Plan: Exam and OCT reveal DME. An IVFA from 10/27/2021 demonstrated no NVE. Fundus Photos from 10/27/2021 demonstrated no NVD. Recommend Avastin series with focal.. RBA discussed.  Pt elects Avastin (bilateral)
Impression: DME, OS. CSCR OS
s/p Focal 02/16/2022
s/p Avastin x 5  last 02/04/2022 Plan: The patient notes blurring. Exam and OCT reveal DME and SRF. An IVFA from 10/27/2021 demonstrated no NVE. Fundus Photos from 10/27/2021 demonstrated no NVD. Recommend Avastin. R/B/A discussed with patient. The risks of Avastin were discussed, including off-label use and compounding pharmacy risks, and the patient elects to proceed with Avastin. After 2% Subconjunctival anesthesia prep, 1.25mg of Avastin was injected in the eye. Cold compress and Tylenol suggested for pain if needed. The patient will use antibiotcs at home. Keon Cuello Patient Return in 1 month for re-eval DME OU
Impression: ERM, OD. Plan: Exam and OCT reveal an ERM OD (383 microns, from 379 microns),  Observe for now.
Impression: JAEL AGUILERA. Plan: Stable.
Impression: JAEL NAVA. Plan: ANTELMO.
Impression: NPDR, OU. Plan: DM since 1990. Moderate.  BS control
23-May-2023

## 2023-05-24 ENCOUNTER — OFFICE VISIT (OUTPATIENT)
Dept: URBAN - METROPOLITAN AREA CLINIC 27 | Facility: CLINIC | Age: 78
End: 2023-05-24
Payer: MEDICARE

## 2023-05-24 DIAGNOSIS — H43.813 VITREOUS DEGENERATION, BILATERAL: ICD-10-CM

## 2023-05-24 DIAGNOSIS — Z96.1 PRESENCE OF INTRAOCULAR LENS: ICD-10-CM

## 2023-05-24 DIAGNOSIS — H35.373 PUCKERING OF MACULA, BILATERAL: ICD-10-CM

## 2023-05-24 DIAGNOSIS — E11.3313 TYPE 2 DIAB W MODERATE NONPRLF DIAB RTNOP W MACULAR EDEMA, BILATERAL: Primary | ICD-10-CM

## 2023-05-24 PROCEDURE — 92134 CPTRZ OPH DX IMG PST SGM RTA: CPT | Performed by: OPHTHALMOLOGY

## 2023-05-24 PROCEDURE — 92014 COMPRE OPH EXAM EST PT 1/>: CPT | Performed by: OPHTHALMOLOGY

## 2023-05-24 ASSESSMENT — INTRAOCULAR PRESSURE
OS: 13
OD: 17

## 2023-05-24 NOTE — IMPRESSION/PLAN
Impression: DME, OS. CSCR OS
s/p Focal 01/20/2023 
s/p Avastin x 14  last 03/01/2023 Plan: Exam and OCT reveal DME and SRF. An IVFA from 01/20/2023 demonstrated no NVE. Fundus Photos from 01/20/2023 demonstrated no NVD. Recommend Avastin. R/B/A discussed with patient. The risks of Avastin were discussed, including off-label use and compounding pharmacy risks, and the patient elects to proceed with Avastin today. The patient will use antibiotcs at home. Thanks, Debora Brandon Return in 8-10 weeks for re-eval DME OU

## 2023-05-24 NOTE — IMPRESSION/PLAN
Impression: DME, OD
s/p Focal 02/01/2023
s/p Avastin x 14  last 03/01/2023 Plan: Exam and OCT reveal DME. An IVFA from 01/20/2023 demonstrated no NVE. Fundus Photos from 01/20/2023 demonstrated no NVD. Carotenoids. Recommend Avastin. RBA discussed.  The patient elects Avastin

## 2023-08-03 ENCOUNTER — OFFICE VISIT (OUTPATIENT)
Dept: URBAN - METROPOLITAN AREA CLINIC 27 | Facility: CLINIC | Age: 78
End: 2023-08-03
Payer: MEDICARE

## 2023-08-03 DIAGNOSIS — Z96.1 PRESENCE OF INTRAOCULAR LENS: ICD-10-CM

## 2023-08-03 DIAGNOSIS — H35.373 PUCKERING OF MACULA, BILATERAL: ICD-10-CM

## 2023-08-03 DIAGNOSIS — E11.3313 TYPE 2 DIAB W MODERATE NONPRLF DIAB RTNOP W MACULAR EDEMA, BILATERAL: ICD-10-CM

## 2023-08-03 DIAGNOSIS — H35.3231 EXUDATIVE AGE-REL MCLR DEGN, BI, WITH ACTV CHRDL NEOVAS: Primary | ICD-10-CM

## 2023-08-03 DIAGNOSIS — H43.813 VITREOUS DEGENERATION, BILATERAL: ICD-10-CM

## 2023-08-03 PROCEDURE — 99214 OFFICE O/P EST MOD 30 MIN: CPT | Performed by: OPHTHALMOLOGY

## 2023-08-03 PROCEDURE — 92134 CPTRZ OPH DX IMG PST SGM RTA: CPT | Performed by: OPHTHALMOLOGY

## 2023-08-03 ASSESSMENT — INTRAOCULAR PRESSURE
OS: 14
OD: 16

## 2023-09-14 ENCOUNTER — OFFICE VISIT (OUTPATIENT)
Dept: URBAN - METROPOLITAN AREA CLINIC 27 | Facility: CLINIC | Age: 78
End: 2023-09-14
Payer: MEDICARE

## 2023-09-14 DIAGNOSIS — H35.373 PUCKERING OF MACULA, BILATERAL: ICD-10-CM

## 2023-09-14 DIAGNOSIS — Z96.1 PRESENCE OF INTRAOCULAR LENS: ICD-10-CM

## 2023-09-14 DIAGNOSIS — H35.3231 EXUDATIVE AGE-REL MCLR DEGN, BI, WITH ACTV CHRDL NEOVAS: Primary | ICD-10-CM

## 2023-09-14 DIAGNOSIS — H43.813 VITREOUS DEGENERATION, BILATERAL: ICD-10-CM

## 2023-09-14 DIAGNOSIS — E11.3313 TYPE 2 DIAB W MODERATE NONPRLF DIAB RTNOP W MACULAR EDEMA, BILATERAL: ICD-10-CM

## 2023-09-14 PROCEDURE — 99214 OFFICE O/P EST MOD 30 MIN: CPT | Performed by: OPHTHALMOLOGY

## 2023-09-14 PROCEDURE — 92134 CPTRZ OPH DX IMG PST SGM RTA: CPT | Performed by: OPHTHALMOLOGY

## 2023-09-14 ASSESSMENT — INTRAOCULAR PRESSURE
OS: 18
OD: 18

## 2023-10-26 ENCOUNTER — OFFICE VISIT (OUTPATIENT)
Dept: URBAN - METROPOLITAN AREA CLINIC 27 | Facility: CLINIC | Age: 78
End: 2023-10-26
Payer: MEDICARE

## 2023-10-26 DIAGNOSIS — Z96.1 PRESENCE OF INTRAOCULAR LENS: ICD-10-CM

## 2023-10-26 DIAGNOSIS — H35.3231 EXUDATIVE AGE-REL MCLR DEGN, BI, WITH ACTV CHRDL NEOVAS: Primary | ICD-10-CM

## 2023-10-26 DIAGNOSIS — E11.3313 TYPE 2 DIAB W MODERATE NONPRLF DIAB RTNOP W MACULAR EDEMA, BILATERAL: ICD-10-CM

## 2023-10-26 DIAGNOSIS — H43.813 VITREOUS DEGENERATION, BILATERAL: ICD-10-CM

## 2023-10-26 DIAGNOSIS — H35.373 PUCKERING OF MACULA, BILATERAL: ICD-10-CM

## 2023-10-26 PROCEDURE — 92014 COMPRE OPH EXAM EST PT 1/>: CPT | Performed by: OPHTHALMOLOGY

## 2023-10-26 PROCEDURE — 92134 CPTRZ OPH DX IMG PST SGM RTA: CPT | Performed by: OPHTHALMOLOGY

## 2023-10-26 ASSESSMENT — INTRAOCULAR PRESSURE
OS: 19
OD: 21

## 2023-12-07 ENCOUNTER — OFFICE VISIT (OUTPATIENT)
Dept: URBAN - METROPOLITAN AREA CLINIC 27 | Facility: CLINIC | Age: 78
End: 2023-12-07
Payer: MEDICARE

## 2023-12-07 PROCEDURE — 92134 CPTRZ OPH DX IMG PST SGM RTA: CPT | Performed by: OPHTHALMOLOGY

## 2023-12-07 ASSESSMENT — INTRAOCULAR PRESSURE
OS: 20
OD: 21

## 2024-01-18 ENCOUNTER — OFFICE VISIT (OUTPATIENT)
Dept: URBAN - METROPOLITAN AREA CLINIC 27 | Facility: CLINIC | Age: 79
End: 2024-01-18
Payer: MEDICARE

## 2024-01-18 DIAGNOSIS — E11.3313 TYPE 2 DIABETES MELLITUS WITH MODERATE NONPROLIFERATIVE DIABETIC RETINOPATHY WITH MACULAR EDEMA, BILATERAL: Primary | ICD-10-CM

## 2024-01-18 PROCEDURE — 92134 CPTRZ OPH DX IMG PST SGM RTA: CPT | Performed by: OPHTHALMOLOGY

## 2024-01-18 ASSESSMENT — INTRAOCULAR PRESSURE
OS: 18
OD: 20

## 2024-02-29 ENCOUNTER — OFFICE VISIT (OUTPATIENT)
Dept: URBAN - METROPOLITAN AREA CLINIC 27 | Facility: CLINIC | Age: 79
End: 2024-02-29
Payer: MEDICARE

## 2024-02-29 DIAGNOSIS — H35.3231 EXUDATIVE AGE-RELATED MACULAR DEGENERATION, BILATERAL, WITH ACTIVE CHOROIDAL NEOVASCULARIZATION: Primary | ICD-10-CM

## 2024-02-29 PROCEDURE — 92134 CPTRZ OPH DX IMG PST SGM RTA: CPT | Performed by: OPHTHALMOLOGY

## 2024-02-29 ASSESSMENT — INTRAOCULAR PRESSURE
OS: 19
OD: 20

## 2024-04-11 ENCOUNTER — OFFICE VISIT (OUTPATIENT)
Dept: URBAN - METROPOLITAN AREA CLINIC 27 | Facility: CLINIC | Age: 79
End: 2024-04-11
Payer: MEDICARE

## 2024-04-11 DIAGNOSIS — Z96.1 PRESENCE OF INTRAOCULAR LENS: ICD-10-CM

## 2024-04-11 DIAGNOSIS — H35.3231 EXUDATIVE AGE-RELATED MACULAR DEGENERATION, BILATERAL, WITH ACTIVE CHOROIDAL NEOVASCULARIZATION: Primary | ICD-10-CM

## 2024-04-11 DIAGNOSIS — H43.813 VITREOUS DEGENERATION, BILATERAL: ICD-10-CM

## 2024-04-11 DIAGNOSIS — H35.373 PUCKERING OF MACULA, BILATERAL: ICD-10-CM

## 2024-04-11 DIAGNOSIS — E11.3313 TYPE 2 DIAB W MODERATE NONPRLF DIAB RTNOP W MACULAR EDEMA, BILATERAL: ICD-10-CM

## 2024-04-11 PROCEDURE — 92134 CPTRZ OPH DX IMG PST SGM RTA: CPT | Performed by: OPHTHALMOLOGY

## 2024-04-11 PROCEDURE — 99214 OFFICE O/P EST MOD 30 MIN: CPT | Performed by: OPHTHALMOLOGY

## 2024-04-11 ASSESSMENT — INTRAOCULAR PRESSURE
OS: 17
OD: 16

## 2024-06-06 ENCOUNTER — OFFICE VISIT (OUTPATIENT)
Dept: URBAN - METROPOLITAN AREA CLINIC 27 | Facility: CLINIC | Age: 79
End: 2024-06-06
Payer: MEDICARE

## 2024-06-06 DIAGNOSIS — E11.3313 TYPE 2 DIABETES MELLITUS WITH MODERATE NONPROLIFERATIVE DIABETIC RETINOPATHY WITH MACULAR EDEMA, BILATERAL: Primary | ICD-10-CM

## 2024-06-06 PROCEDURE — 92134 CPTRZ OPH DX IMG PST SGM RTA: CPT | Performed by: OPHTHALMOLOGY

## 2024-06-06 ASSESSMENT — INTRAOCULAR PRESSURE
OD: 14
OS: 12

## 2024-08-15 ENCOUNTER — OFFICE VISIT (OUTPATIENT)
Dept: URBAN - METROPOLITAN AREA CLINIC 27 | Facility: CLINIC | Age: 79
End: 2024-08-15
Payer: MEDICARE

## 2024-08-15 DIAGNOSIS — E11.3313 TYPE 2 DIABETES MELLITUS WITH MODERATE NONPROLIFERATIVE DIABETIC RETINOPATHY WITH MACULAR EDEMA, BILATERAL: Primary | ICD-10-CM

## 2024-08-15 PROCEDURE — 92134 CPTRZ OPH DX IMG PST SGM RTA: CPT | Performed by: OPHTHALMOLOGY

## 2024-08-15 ASSESSMENT — INTRAOCULAR PRESSURE
OS: 19
OD: 18

## 2024-10-10 ENCOUNTER — OFFICE VISIT (OUTPATIENT)
Dept: URBAN - METROPOLITAN AREA CLINIC 27 | Facility: CLINIC | Age: 79
End: 2024-10-10
Payer: MEDICARE

## 2024-10-10 DIAGNOSIS — E11.3313 TYPE 2 DIABETES MELLITUS WITH MODERATE NONPROLIFERATIVE DIABETIC RETINOPATHY WITH MACULAR EDEMA, BILATERAL: Primary | ICD-10-CM

## 2024-10-10 ASSESSMENT — INTRAOCULAR PRESSURE
OD: 21
OS: 18

## 2024-12-05 ENCOUNTER — OFFICE VISIT (OUTPATIENT)
Dept: URBAN - METROPOLITAN AREA CLINIC 27 | Facility: CLINIC | Age: 79
End: 2024-12-05
Payer: MEDICARE

## 2024-12-05 DIAGNOSIS — H35.373 PUCKERING OF MACULA, BILATERAL: ICD-10-CM

## 2024-12-05 DIAGNOSIS — H43.813 VITREOUS DEGENERATION, BILATERAL: ICD-10-CM

## 2024-12-05 DIAGNOSIS — Z96.1 PRESENCE OF INTRAOCULAR LENS: ICD-10-CM

## 2024-12-05 DIAGNOSIS — H35.3231 EXUDATIVE AGE-REL MCLR DEGN, BI, WITH ACTV CHRDL NEOVAS: Primary | ICD-10-CM

## 2024-12-05 DIAGNOSIS — E11.3313 TYPE 2 DIABETES MELLITUS WITH MODERATE NONPROLIFERATIVE DIABETIC RETINOPATHY WITH MACULAR EDEMA, BILATERAL: ICD-10-CM

## 2024-12-05 PROCEDURE — 92134 CPTRZ OPH DX IMG PST SGM RTA: CPT | Performed by: OPHTHALMOLOGY

## 2024-12-05 PROCEDURE — 92014 COMPRE OPH EXAM EST PT 1/>: CPT | Performed by: OPHTHALMOLOGY

## 2024-12-05 ASSESSMENT — INTRAOCULAR PRESSURE
OS: 16
OD: 19

## 2025-01-30 ENCOUNTER — OFFICE VISIT (OUTPATIENT)
Dept: URBAN - METROPOLITAN AREA CLINIC 27 | Facility: CLINIC | Age: 80
End: 2025-01-30
Payer: MEDICARE

## 2025-01-30 DIAGNOSIS — H35.3231 EXUDATIVE AGE-RELATED MACULAR DEGENERATION, BILATERAL, WITH ACTIVE CHOROIDAL NEOVASCULARIZATION: ICD-10-CM

## 2025-01-30 DIAGNOSIS — E11.3313 TYPE 2 DIABETES MELLITUS WITH MODERATE NONPROLIFERATIVE DIABETIC RETINOPATHY WITH MACULAR EDEMA, BILATERAL: Primary | ICD-10-CM

## 2025-01-30 PROCEDURE — 92134 CPTRZ OPH DX IMG PST SGM RTA: CPT | Performed by: OPHTHALMOLOGY

## 2025-01-30 ASSESSMENT — INTRAOCULAR PRESSURE
OD: 13
OS: 15

## 2025-03-27 ENCOUNTER — OFFICE VISIT (OUTPATIENT)
Dept: URBAN - METROPOLITAN AREA CLINIC 27 | Facility: CLINIC | Age: 80
End: 2025-03-27
Payer: MEDICARE

## 2025-03-27 DIAGNOSIS — E11.3313 TYPE 2 DIABETES MELLITUS WITH MODERATE NONPROLIFERATIVE DIABETIC RETINOPATHY WITH MACULAR EDEMA, BILATERAL: Primary | ICD-10-CM

## 2025-03-27 PROCEDURE — 92134 CPTRZ OPH DX IMG PST SGM RTA: CPT | Performed by: OPHTHALMOLOGY

## 2025-03-27 ASSESSMENT — INTRAOCULAR PRESSURE
OD: 23
OS: 23

## 2025-04-24 ENCOUNTER — OFFICE VISIT (OUTPATIENT)
Dept: URBAN - METROPOLITAN AREA CLINIC 27 | Facility: CLINIC | Age: 80
End: 2025-04-24
Payer: MEDICARE

## 2025-04-24 DIAGNOSIS — E11.3313 TYPE 2 DIABETES MELLITUS WITH MODERATE NONPROLIFERATIVE DIABETIC RETINOPATHY WITH MACULAR EDEMA, BILATERAL: Primary | ICD-10-CM

## 2025-04-24 PROCEDURE — 92134 CPTRZ OPH DX IMG PST SGM RTA: CPT | Performed by: OPHTHALMOLOGY

## 2025-04-24 ASSESSMENT — INTRAOCULAR PRESSURE
OS: 20
OD: 21

## 2025-06-05 ENCOUNTER — OFFICE VISIT (OUTPATIENT)
Dept: URBAN - METROPOLITAN AREA CLINIC 27 | Facility: CLINIC | Age: 80
End: 2025-06-05
Payer: MEDICARE

## 2025-06-05 DIAGNOSIS — Z96.1 PRESENCE OF INTRAOCULAR LENS: ICD-10-CM

## 2025-06-05 DIAGNOSIS — H35.3231 EXUDATIVE AGE-REL MCLR DEGN, BI, WITH ACTV CHRDL NEOVAS: ICD-10-CM

## 2025-06-05 DIAGNOSIS — H04.123 DRY EYE SYNDROME OF BILATERAL LACRIMAL GLANDS: ICD-10-CM

## 2025-06-05 DIAGNOSIS — H43.813 VITREOUS DEGENERATION, BILATERAL: ICD-10-CM

## 2025-06-05 DIAGNOSIS — E11.3313 TYPE 2 DIABETES MELLITUS WITH MODERATE NONPROLIFERATIVE DIABETIC RETINOPATHY WITH MACULAR EDEMA, BILATERAL: Primary | ICD-10-CM

## 2025-06-05 DIAGNOSIS — H35.373 PUCKERING OF MACULA, BILATERAL: ICD-10-CM

## 2025-06-05 PROCEDURE — 92134 CPTRZ OPH DX IMG PST SGM RTA: CPT | Performed by: OPHTHALMOLOGY

## 2025-06-05 PROCEDURE — 92012 INTRM OPH EXAM EST PATIENT: CPT | Performed by: OPHTHALMOLOGY

## 2025-06-05 ASSESSMENT — INTRAOCULAR PRESSURE
OD: 14
OS: 13

## 2025-07-03 ENCOUNTER — OFFICE VISIT (OUTPATIENT)
Dept: URBAN - METROPOLITAN AREA CLINIC 27 | Facility: CLINIC | Age: 80
End: 2025-07-03
Payer: MEDICARE

## 2025-07-03 DIAGNOSIS — E11.3313 TYPE 2 DIABETES MELLITUS WITH MODERATE NONPROLIFERATIVE DIABETIC RETINOPATHY WITH MACULAR EDEMA, BILATERAL: Primary | ICD-10-CM

## 2025-07-03 PROCEDURE — 92134 CPTRZ OPH DX IMG PST SGM RTA: CPT | Performed by: OPHTHALMOLOGY

## 2025-07-03 ASSESSMENT — INTRAOCULAR PRESSURE
OS: 19
OD: 21

## 2025-08-14 ENCOUNTER — OFFICE VISIT (OUTPATIENT)
Dept: URBAN - METROPOLITAN AREA CLINIC 27 | Facility: CLINIC | Age: 80
End: 2025-08-14
Payer: MEDICARE

## 2025-08-14 DIAGNOSIS — E11.3313 TYPE 2 DIABETES MELLITUS WITH MODERATE NONPROLIFERATIVE DIABETIC RETINOPATHY WITH MACULAR EDEMA, BILATERAL: Primary | ICD-10-CM

## 2025-08-14 PROCEDURE — 92134 CPTRZ OPH DX IMG PST SGM RTA: CPT | Performed by: OPHTHALMOLOGY

## 2025-08-14 ASSESSMENT — INTRAOCULAR PRESSURE
OS: 19
OD: 19